# Patient Record
Sex: FEMALE | Race: AMERICAN INDIAN OR ALASKA NATIVE
[De-identification: names, ages, dates, MRNs, and addresses within clinical notes are randomized per-mention and may not be internally consistent; named-entity substitution may affect disease eponyms.]

---

## 2019-10-05 ENCOUNTER — HOSPITAL ENCOUNTER (EMERGENCY)
Dept: HOSPITAL 5 - ED | Age: 29
Discharge: LEFT BEFORE DISCHARGE | End: 2019-10-05
Payer: MEDICAID

## 2019-10-05 DIAGNOSIS — Z53.21: ICD-10-CM

## 2019-10-05 DIAGNOSIS — N93.9: Primary | ICD-10-CM

## 2019-10-06 ENCOUNTER — HOSPITAL ENCOUNTER (EMERGENCY)
Dept: HOSPITAL 5 - ED | Age: 29
Discharge: HOME | End: 2019-10-06
Payer: MEDICAID

## 2019-10-06 VITALS — SYSTOLIC BLOOD PRESSURE: 132 MMHG | DIASTOLIC BLOOD PRESSURE: 79 MMHG

## 2019-10-06 DIAGNOSIS — Z3A.08: ICD-10-CM

## 2019-10-06 DIAGNOSIS — O20.0: Primary | ICD-10-CM

## 2019-10-06 LAB
ALBUMIN SERPL-MCNC: 3.9 G/DL (ref 3.9–5)
ALT SERPL-CCNC: 7 UNITS/L (ref 7–56)
APTT BLD: 27.2 SEC. (ref 24.2–36.6)
BACTERIA #/AREA URNS HPF: (no result) /HPF
BASOPHILS # (AUTO): 0.1 K/MM3 (ref 0–0.1)
BASOPHILS NFR BLD AUTO: 0.5 % (ref 0–1.8)
BILIRUB UR QL STRIP: (no result)
BLOOD UR QL VISUAL: (no result)
BUN SERPL-MCNC: 10 MG/DL (ref 7–17)
BUN/CREAT SERPL: 14 %
CALCIUM SERPL-MCNC: 8.3 MG/DL (ref 8.4–10.2)
EOSINOPHIL # BLD AUTO: 0.2 K/MM3 (ref 0–0.4)
EOSINOPHIL NFR BLD AUTO: 2.1 % (ref 0–4.3)
HCT VFR BLD CALC: 36.3 % (ref 30.3–42.9)
HEMOLYSIS INDEX: 8
HGB BLD-MCNC: 12 GM/DL (ref 10.1–14.3)
INR PPP: 0.96 (ref 0.87–1.13)
LYMPHOCYTES # BLD AUTO: 3.1 K/MM3 (ref 1.2–5.4)
LYMPHOCYTES NFR BLD AUTO: 28.4 % (ref 13.4–35)
MCHC RBC AUTO-ENTMCNC: 33 % (ref 30–34)
MCV RBC AUTO: 79 FL (ref 79–97)
MONOCYTES # (AUTO): 0.6 K/MM3 (ref 0–0.8)
MONOCYTES % (AUTO): 5.2 % (ref 0–7.3)
MUCOUS THREADS #/AREA URNS HPF: (no result) /HPF
PH UR STRIP: 6 [PH] (ref 5–7)
PLATELET # BLD: 270 K/MM3 (ref 140–440)
PROT UR STRIP-MCNC: (no result) MG/DL
RBC # BLD AUTO: 4.63 M/MM3 (ref 3.65–5.03)
RBC #/AREA URNS HPF: 2 /HPF (ref 0–6)
UROBILINOGEN UR-MCNC: < 2 MG/DL (ref ?–2)
WBC #/AREA URNS HPF: < 1 /HPF (ref 0–6)

## 2019-10-06 PROCEDURE — 85025 COMPLETE CBC W/AUTO DIFF WBC: CPT

## 2019-10-06 PROCEDURE — 99285 EMERGENCY DEPT VISIT HI MDM: CPT

## 2019-10-06 PROCEDURE — 96360 HYDRATION IV INFUSION INIT: CPT

## 2019-10-06 PROCEDURE — 36415 COLL VENOUS BLD VENIPUNCTURE: CPT

## 2019-10-06 PROCEDURE — 87591 N.GONORRHOEAE DNA AMP PROB: CPT

## 2019-10-06 PROCEDURE — 80053 COMPREHEN METABOLIC PANEL: CPT

## 2019-10-06 PROCEDURE — 96361 HYDRATE IV INFUSION ADD-ON: CPT

## 2019-10-06 PROCEDURE — 76817 TRANSVAGINAL US OBSTETRIC: CPT

## 2019-10-06 PROCEDURE — 85610 PROTHROMBIN TIME: CPT

## 2019-10-06 PROCEDURE — 85730 THROMBOPLASTIN TIME PARTIAL: CPT

## 2019-10-06 PROCEDURE — 84702 CHORIONIC GONADOTROPIN TEST: CPT

## 2019-10-06 PROCEDURE — 76801 OB US < 14 WKS SINGLE FETUS: CPT

## 2019-10-06 PROCEDURE — 87210 SMEAR WET MOUNT SALINE/INK: CPT

## 2019-10-06 PROCEDURE — 86900 BLOOD TYPING SEROLOGIC ABO: CPT

## 2019-10-06 PROCEDURE — 81025 URINE PREGNANCY TEST: CPT

## 2019-10-06 PROCEDURE — 81001 URINALYSIS AUTO W/SCOPE: CPT

## 2019-10-06 PROCEDURE — 86901 BLOOD TYPING SEROLOGIC RH(D): CPT

## 2019-10-06 NOTE — EMERGENCY DEPARTMENT REPORT
ED Female  HPI





- General


Chief complaint: Vaginal Bleeding


Stated complaint: 8WKS PREGNANT HEAVY BLEEDING/CRAMPS


Time Seen by Provider: 10/06/19 09:36


Source: patient


Mode of arrival: Ambulatory


Limitations: No Limitations





- History of Present Illness


Initial comments: 





Patient complains of vaginal spotting which started yesterday.  She also 

complained of left lower quadrant abdominal cramping.  She has a history of 

ectopic pregnancy which was treated with surgery in .   5, para 2, 1 

 and 1 ectopic pregnancy.  She denies any other past medical history.  

She denies nausea, vomiting, diarrhea, fever or chills.  She also denies chest 

pain or shortness of breath.  She sexually active with one partner and denies 

any history of sexually transmitted infection.  Patient said she is about 8 

weeks pregnant.


MD Complaint: vaginal bleeding


-: Sudden (yesterday)


Location: perineum


Radiation: LLQ


Severity: mild


Severity scale (0 -10): 2


Quality: cramping


Consistency: constant


Improves with: none


Worsens with: none


Are you Pregnant Now?: Yes (8 weeks pregnant.)


Associated Symptoms: vaginal bleeding





- Related Data


Sexually active: Yes


: 5


Para: 2


A: 2


                                  Previous Rx's











 Medication  Instructions  Recorded  Last Taken  Type


 


Acetaminophen [Tylenol] 500 mg PO Q6HR PRN #20 tablet 10/06/19 Unknown Rx











                                    Allergies











Allergy/AdvReac Type Severity Reaction Status Date / Time


 


No Known Allergies Allergy   Unverified 10/06/19 09:23














ED Review of Systems


ROS: 


Stated complaint: 8WKS PREGNANT HEAVY BLEEDING/CRAMPS


Other details as noted in HPI





Comment: All other systems reviewed and negative


Constitutional: denies: chills, fever


Eyes: denies: eye pain, eye discharge, vision change


ENT: denies: ear pain, throat pain


Respiratory: denies: cough, shortness of breath, wheezing


Cardiovascular: denies: chest pain, palpitations


Endocrine: no symptoms reported


Gastrointestinal: abdominal pain (LLQ).  denies: nausea, diarrhea


Genitourinary: other (Vaginal spotting.).  denies: urgency, dysuria, discharge


Musculoskeletal: denies: back pain, joint swelling, arthralgia


Skin: denies: rash, lesions


Neurological: denies: headache, weakness, paresthesias


Psychiatric: denies: anxiety, depression


Hematological/Lymphatic: denies: easy bleeding, easy bruising





ED Past Medical Hx





- Past Medical History


Previous Medical History?: No





- Surgical History


Additional Surgical History: ecotopic





- Social History


Smoking Status: Never Smoker


Substance Use Type: None





- Medications


Home Medications: 


                                Home Medications











 Medication  Instructions  Recorded  Confirmed  Last Taken  Type


 


Acetaminophen [Tylenol] 500 mg PO Q6HR PRN #20 tablet 10/06/19  Unknown Rx














ED Physical Exam





- General


Limitations: No Limitations


General appearance: alert, in no apparent distress





- Head


Head exam: Present: atraumatic, normocephalic





- Eye


Eye exam: Present: normal appearance, PERRL





- ENT


ENT exam: Present: normal exam, normal orophraynx, mucous membranes moist





- Neck


Neck exam: Present: normal inspection





- Respiratory


Respiratory exam: Present: normal lung sounds bilaterally.  Absent: respiratory 

distress





- Cardiovascular


Cardiovascular Exam: Present: regular rate, normal rhythm.  Absent: systolic 

murmur, diastolic murmur, rubs, gallop





- GI/Abdominal


GI/Abdominal exam: Present: soft, tenderness (Mild LLQ tenderness to 

palpation.), normal bowel sounds.  Absent: guarding, rebound





- Rectal


Rectal exam: Present: deferred





- 


External exam: Present: normal external exam, bleeding.  Absent: lacerations


Speculum exam: Present: vaginal bleeding.  Absent: vaginal discharge, foreign 

body, tissue, laceration


Bi-manual exam: Present: adnexal tenderness (left adnexal tenderness.), other (fritz briceno was Ms. Ronan RN..).  Absent: cervical motion tendernes, adnexal 

mass, uterine enlargement, uterine tenderness





- Extremities Exam


Extremities exam: Present: normal inspection





- Back Exam


Back exam: Present: normal inspection





- Neurological Exam


Neurological exam: Present: alert, oriented X3





- Psychiatric


Psychiatric exam: Present: normal affect, normal mood





- Skin


Skin exam: Present: warm, dry, intact, normal color.  Absent: rash





ED Course


                                   Vital Signs











  10/06/19





  09:23


 


Temperature 98.2 F


 


Pulse Rate 74


 


Respiratory 18





Rate 


 


Blood Pressure 132/79


 


O2 Sat by Pulse 100





Oximetry 














- Reevaluation(s)


Reevaluation #1: 





10/06/19 14:01


Patient has no new complaints I explained her lab results and ultrasound results

 and the need for her to follow up with Dr. Richard Jacome OB/GYN tomorrow [place] for the repeat of her beta HCG quantitative. I also advised her to return to 

the emergency room on Tuesday which in in 2 days time if she is unable to follow

 up with an OB/GYN for a repeat of beta-HCG quantitative.





- Consultations


Consultation #1: 





10/06/19 13:59


I called and spoke with the OB/GYN on-call Dr. Richard Jacome.  he recommended 

discharging patient home to follow-up with him in his OB/GYN clinic tomorrow and

 he will follow-up on repeating her beta quantitative in his office tomorrow.





ED Medical Decision Making





- Lab Data


Result diagrams: 


                                 10/06/19 09:56





                                 10/06/19 11:36








                                   Lab Results











  10/06/19 10/06/19 10/06/19 Range/Units





  09:56 09:56 09:56 


 


WBC  10.9    (4.5-11.0)  K/mm3


 


RBC  4.63    (3.65-5.03)  M/mm3


 


Hgb  12.0    (10.1-14.3)  gm/dl


 


Hct  36.3    (30.3-42.9)  %


 


MCV  79    (79-97)  fl


 


MCH  26 L    (28-32)  pg


 


MCHC  33    (30-34)  %


 


RDW  15.6 H    (13.2-15.2)  %


 


Plt Count  270    (140-440)  K/mm3


 


Lymph % (Auto)  28.4    (13.4-35.0)  %


 


Mono % (Auto)  5.2    (0.0-7.3)  %


 


Eos % (Auto)  2.1    (0.0-4.3)  %


 


Baso % (Auto)  0.5    (0.0-1.8)  %


 


Lymph #  3.1    (1.2-5.4)  K/mm3


 


Mono #  0.6    (0.0-0.8)  K/mm3


 


Eos #  0.2    (0.0-0.4)  K/mm3


 


Baso #  0.1    (0.0-0.1)  K/mm3


 


Seg Neutrophils %  63.8    (40.0-70.0)  %


 


Seg Neutrophils #  7.0    (1.8-7.7)  K/mm3


 


PT   12.5   (12.2-14.9)  Sec.


 


INR   0.96   (0.87-1.13)  


 


APTT   27.2   (24.2-36.6)  Sec.


 


Sodium     (137-145)  mmol/L


 


Potassium     (3.6-5.0)  mmol/L


 


Chloride     ()  mmol/L


 


Carbon Dioxide     (22-30)  mmol/L


 


Anion Gap     mmol/L


 


BUN     (7-17)  mg/dL


 


Creatinine     (0.7-1.2)  mg/dL


 


Estimated GFR     ml/min


 


BUN/Creatinine Ratio     %


 


Glucose     ()  mg/dL


 


Calcium     (8.4-10.2)  mg/dL


 


Total Bilirubin     (0.1-1.2)  mg/dL


 


AST     (5-40)  units/L


 


ALT     (7-56)  units/L


 


Alkaline Phosphatase     ()  units/L


 


Total Protein     (6.3-8.2)  g/dL


 


Albumin     (3.9-5)  g/dL


 


Albumin/Globulin Ratio     %


 


HCG, Quant    541.5 H  (0-4)  mIU/mL


 


Urine Color     (Yellow)  


 


Urine Turbidity     (Clear)  


 


Urine pH     (5.0-7.0)  


 


Ur Specific Gravity     (1.003-1.030)  


 


Urine Protein     (Negative)  mg/dL


 


Urine Glucose (UA)     (Negative)  mg/dL


 


Urine Ketones     (Negative)  mg/dL


 


Urine Blood     (Negative)  


 


Urine Nitrite     (Negative)  


 


Urine Bilirubin     (Negative)  


 


Urine Urobilinogen     (<2.0)  mg/dL


 


Ur Leukocyte Esterase     (Negative)  


 


Urine WBC (Auto)     (0.0-6.0)  /HPF


 


Urine RBC (Auto)     (0.0-6.0)  /HPF


 


U Epithel Cells (Auto)     (0-13.0)  /HPF


 


Urine Bacteria (Auto)     (Negative)  /HPF


 


Urine Mucus     /HPF


 


Urine HCG, Qual     (Negative)  


 


Blood Type     














  10/06/19 10/06/19 10/06/19 Range/Units





  09:56 10:15 11:36 


 


WBC     (4.5-11.0)  K/mm3


 


RBC     (3.65-5.03)  M/mm3


 


Hgb     (10.1-14.3)  gm/dl


 


Hct     (30.3-42.9)  %


 


MCV     (79-97)  fl


 


MCH     (28-32)  pg


 


MCHC     (30-34)  %


 


RDW     (13.2-15.2)  %


 


Plt Count     (140-440)  K/mm3


 


Lymph % (Auto)     (13.4-35.0)  %


 


Mono % (Auto)     (0.0-7.3)  %


 


Eos % (Auto)     (0.0-4.3)  %


 


Baso % (Auto)     (0.0-1.8)  %


 


Lymph #     (1.2-5.4)  K/mm3


 


Mono #     (0.0-0.8)  K/mm3


 


Eos #     (0.0-0.4)  K/mm3


 


Baso #     (0.0-0.1)  K/mm3


 


Seg Neutrophils %     (40.0-70.0)  %


 


Seg Neutrophils #     (1.8-7.7)  K/mm3


 


PT     (12.2-14.9)  Sec.


 


INR     (0.87-1.13)  


 


APTT     (24.2-36.6)  Sec.


 


Sodium    137  (137-145)  mmol/L


 


Potassium    3.7  (3.6-5.0)  mmol/L


 


Chloride    101.2  ()  mmol/L


 


Carbon Dioxide    23  (22-30)  mmol/L


 


Anion Gap    17  mmol/L


 


BUN    10  (7-17)  mg/dL


 


Creatinine    0.7  (0.7-1.2)  mg/dL


 


Estimated GFR    > 60  ml/min


 


BUN/Creatinine Ratio    14  %


 


Glucose    106 H  ()  mg/dL


 


Calcium    8.3 L  (8.4-10.2)  mg/dL


 


Total Bilirubin    0.20  (0.1-1.2)  mg/dL


 


AST    12  (5-40)  units/L


 


ALT    7  (7-56)  units/L


 


Alkaline Phosphatase    39  ()  units/L


 


Total Protein    6.7  (6.3-8.2)  g/dL


 


Albumin    3.9  (3.9-5)  g/dL


 


Albumin/Globulin Ratio    1.4  %


 


HCG, Quant     (0-4)  mIU/mL


 


Urine Color   Yellow   (Yellow)  


 


Urine Turbidity   Clear   (Clear)  


 


Urine pH   6.0   (5.0-7.0)  


 


Ur Specific Gravity   1.024   (1.003-1.030)  


 


Urine Protein   <15 mg/dl   (Negative)  mg/dL


 


Urine Glucose (UA)   Neg   (Negative)  mg/dL


 


Urine Ketones   Neg   (Negative)  mg/dL


 


Urine Blood   Lg   (Negative)  


 


Urine Nitrite   Neg   (Negative)  


 


Urine Bilirubin   Neg   (Negative)  


 


Urine Urobilinogen   < 2.0   (<2.0)  mg/dL


 


Ur Leukocyte Esterase   Neg   (Negative)  


 


Urine WBC (Auto)   < 1.0   (0.0-6.0)  /HPF


 


Urine RBC (Auto)   2.0   (0.0-6.0)  /HPF


 


U Epithel Cells (Auto)   5.0   (0-13.0)  /HPF


 


Urine Bacteria (Auto)   1+   (Negative)  /HPF


 


Urine Mucus   1+   /HPF


 


Urine HCG, Qual   Positive A   (Negative)  


 


Blood Type  B POSITIVE    














- Radiology Data


Radiology results: report reviewed





Transvaginal ultrasound showed no IUP.  The radiologist recommended repeating 

the beta-hCG and a couple of days.


Critical care attestation.: 


If time is entered above; I have spent that time in minutes in the direct care 

of this critically ill patient, excluding procedure time.








ED Disposition


Clinical Impression: 


 Threatened  in early pregnancy, Vaginal bleeding affecting early 

pregnancy





Disposition: DC-01 TO HOME OR SELFCARE


Is pt being admited?: No


Does the pt Need Aspirin: No


Condition: Stable


Instructions:  Threatened Miscarriage (ED)


Additional Instructions: 


Please follow up with Dr. Richard SAUNDERS tomorrow morning in his office for

 the repeats of your beta HCG quantitative.


Return to the emergency room if her condition worsens or if you having severe 

abdominal pain or severe vaginal bleeding.


Prescriptions: 


Acetaminophen [Tylenol] 500 mg PO Q6HR PRN #20 tablet


 PRN Reason: Pain , Severe (7-10)


Referrals: 


PRIMARY CARE,MD [Primary Care Provider] - 3-5 Days


RICHARD JACOME MD [Staff Physician] - 3-5 Days


Forms:  Work/School Release Form(ED)

## 2019-10-06 NOTE — ULTRASOUND REPORT
ULTRASOUND OBSTETRIC 



INDICATION / CLINICAL INFORMATION:

Vaginal bleeding. History of prior ectopic pregnancy



TECHNIQUE:

Transabdominal and Transvaginal. Duplex ultrasound with spectral technique of both ovaries  



COMPARISON:

None available.



FINDINGS:

No IUP visualized on the transabdominal or transvaginal images. The endometrial stripe is slightly he
terogeneous and thickened measuring 12 mm in thickness. A 1.2 x 1.1 x 1.2 cm uterine leiomyoma seen w
ithin the uterine fundus.



ADNEXA: Right ovary measures 3.4 x 2.0 x 2.9 cm. Small hyperechoic right ovarian lesion measures 1.9 
x 1.3 x 1.5 cm and appears solid with increased blood flow within it. Left ovary measures 2.3 x 1.5 x
 1.8 cm without cyst or mass.

FREE FLUID: Trace amount of free fluid within the pelvic cul-de-sac



ADDITIONAL FINDINGS: Normal color Doppler and spectral waveforms are present within both ovaries with
out sonographic evidence for torsion.



IMPRESSION:

1. No IUP visualized. Slightly heterogeneous endometrial stripe likely secondary to recent miscarriag
e. Please confirm with follow-up beta hCG and short-term ultrasound in order to completely exclude no
nvisualized early IUP or ectopic.

2. Small solid 1.9 cm right ovarian mass.

3. Leiomyomatous uterus



Signer Name: Dheeraj Daugherty MD 

Signed: 10/6/2019 1:09 PM

 Workstation Name: [email or web address]

## 2019-10-06 NOTE — ULTRASOUND REPORT
ULTRASOUND OBSTETRIC 



INDICATION / CLINICAL INFORMATION:

Vaginal bleeding. History of prior ectopic pregnancy



TECHNIQUE:

Transabdominal and Transvaginal. Duplex ultrasound with spectral technique of both ovaries  



COMPARISON:

None available.



FINDINGS:

No IUP visualized on the transabdominal or transvaginal images. The endometrial stripe is slightly he
terogeneous and thickened measuring 12 mm in thickness. A 1.2 x 1.1 x 1.2 cm uterine leiomyoma seen w
ithin the uterine fundus.



ADNEXA: Right ovary measures 3.4 x 2.0 x 2.9 cm. Small hyperechoic right ovarian lesion measures 1.9 
x 1.3 x 1.5 cm and appears solid with increased blood flow within it. Left ovary measures 2.3 x 1.5 x
 1.8 cm without cyst or mass.

FREE FLUID: Trace amount of free fluid within the pelvic cul-de-sac



ADDITIONAL FINDINGS: Normal color Doppler and spectral waveforms are present within both ovaries with
out sonographic evidence for torsion.



IMPRESSION:

1. No IUP visualized. Slightly heterogeneous endometrial stripe likely secondary to recent miscarriag
e. Please confirm with follow-up beta hCG and short-term ultrasound in order to completely exclude no
nvisualized early IUP or ectopic.

2. Small solid 1.9 cm right ovarian mass.

3. Leiomyomatous uterus



Signer Name: Dheeraj Daugherty MD 

Signed: 10/6/2019 1:09 PM

 Workstation Name: [email or web address]